# Patient Record
Sex: MALE | Race: OTHER | Employment: FULL TIME | URBAN - METROPOLITAN AREA
[De-identification: names, ages, dates, MRNs, and addresses within clinical notes are randomized per-mention and may not be internally consistent; named-entity substitution may affect disease eponyms.]

---

## 2019-08-08 ENCOUNTER — HOSPITAL ENCOUNTER (EMERGENCY)
Age: 29
Discharge: HOME | End: 2019-08-08
Attending: EMERGENCY MEDICINE
Payer: COMMERCIAL

## 2019-08-08 ENCOUNTER — APPOINTMENT (OUTPATIENT)
Dept: GENERAL RADIOLOGY | Age: 29
End: 2019-08-08
Attending: PHYSICIAN ASSISTANT
Payer: COMMERCIAL

## 2019-08-08 VITALS
HEIGHT: 66 IN | TEMPERATURE: 99.4 F | HEART RATE: 89 BPM | BODY MASS INDEX: 30.53 KG/M2 | RESPIRATION RATE: 14 BRPM | DIASTOLIC BLOOD PRESSURE: 84 MMHG | SYSTOLIC BLOOD PRESSURE: 146 MMHG | WEIGHT: 190 LBS

## 2019-08-08 DIAGNOSIS — M62.830 BACK MUSCLE SPASM: Primary | ICD-10-CM

## 2019-08-08 PROCEDURE — 96372 THER/PROPH/DIAG INJ SC/IM: CPT

## 2019-08-08 PROCEDURE — 72072 X-RAY EXAM THORAC SPINE 3VWS: CPT

## 2019-08-08 PROCEDURE — 74011250637 HC RX REV CODE- 250/637: Performed by: PHYSICIAN ASSISTANT

## 2019-08-08 PROCEDURE — 72100 X-RAY EXAM L-S SPINE 2/3 VWS: CPT

## 2019-08-08 PROCEDURE — 99283 EMERGENCY DEPT VISIT LOW MDM: CPT

## 2019-08-08 PROCEDURE — 74011250636 HC RX REV CODE- 250/636: Performed by: PHYSICIAN ASSISTANT

## 2019-08-08 RX ORDER — FENTANYL CITRATE 50 UG/ML
50 INJECTION, SOLUTION INTRAMUSCULAR; INTRAVENOUS
Status: DISCONTINUED | OUTPATIENT
Start: 2019-08-08 | End: 2019-08-09 | Stop reason: HOSPADM

## 2019-08-08 RX ORDER — NAPROXEN 500 MG/1
500 TABLET ORAL 2 TIMES DAILY WITH MEALS
Qty: 20 TAB | Refills: 0 | Status: SHIPPED | OUTPATIENT
Start: 2019-08-08 | End: 2019-08-18

## 2019-08-08 RX ORDER — METHOCARBAMOL 750 MG/1
750 TABLET, FILM COATED ORAL
Qty: 15 TAB | Refills: 0 | Status: SHIPPED | OUTPATIENT
Start: 2019-08-08

## 2019-08-08 RX ORDER — METHOCARBAMOL 750 MG/1
750 TABLET, FILM COATED ORAL
Status: COMPLETED | OUTPATIENT
Start: 2019-08-08 | End: 2019-08-08

## 2019-08-08 RX ORDER — KETOROLAC TROMETHAMINE 30 MG/ML
60 INJECTION, SOLUTION INTRAMUSCULAR; INTRAVENOUS
Status: COMPLETED | OUTPATIENT
Start: 2019-08-08 | End: 2019-08-08

## 2019-08-08 RX ORDER — DEXAMETHASONE SODIUM PHOSPHATE 10 MG/ML
10 INJECTION INTRAMUSCULAR; INTRAVENOUS
Status: DISCONTINUED | OUTPATIENT
Start: 2019-08-08 | End: 2019-08-09 | Stop reason: HOSPADM

## 2019-08-08 RX ADMIN — METHOCARBAMOL TABLETS 750 MG: 750 TABLET, COATED ORAL at 20:40

## 2019-08-08 RX ADMIN — KETOROLAC TROMETHAMINE 60 MG: 30 INJECTION, SOLUTION INTRAMUSCULAR at 20:40

## 2019-08-08 NOTE — ED PROVIDER NOTES
Suzan Wynne is a 33 y.o. male with no pertinent PMH presents to emergency room ambulatory for evaluation of lower thoracic, lumbar back pain. 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 He states while he was working today carrying a heavy object of her shoulders he felt a sudden twinge of pain in his lower thoracic and upper and middle lumbar spine. He has intermittent twinges of pain only in his back. He tried naproxen with little improvement. He denies numbness, tingling, weakness, saddle anesthesia, bowel/bladder dysfunction, urinary symptoms, flank pain, fever, chills, N/V/D. Ambulating without problem. Pain reproducible with movement and position change. Patient reports \"lifting something the wrong way\" at work today and c/o immediate onset lower back pain. He reports difficulty \"sitting up straight\" secondary to that. No urinary or bowel incontinence. Patient declines offer for phone , his partner translates for him at his request.    PCP: None    Surgical hx- see chart  Social hx- no ETOH    The patient has no other complaints at this time. No past medical history on file. No past surgical history on file. No family history on file.     Social History     Socioeconomic History    Marital status: Not on file     Spouse name: Not on file    Number of children: Not on file    Years of education: Not on file    Highest education level: Not on file   Occupational History    Not on file   Social Needs    Financial resource strain: Not on file    Food insecurity:     Worry: Not on file     Inability: Not on file    Transportation needs:     Medical: Not on file     Non-medical: Not on file   Tobacco Use    Smoking status: Not on file   Substance and Sexual Activity    Alcohol use: Not on file    Drug use: Not on file    Sexual activity: Not on file   Lifestyle    Physical activity:     Days per week: Not on file     Minutes per session: Not on file    Stress: Not on file   Relationships    Social connections:     Talks on phone: Not on file     Gets together: Not on file     Attends Druze service: Not on file     Active member of club or organization: Not on file     Attends meetings of clubs or organizations: Not on file     Relationship status: Not on file    Intimate partner violence:     Fear of current or ex partner: Not on file     Emotionally abused: Not on file     Physically abused: Not on file     Forced sexual activity: Not on file   Other Topics Concern    Not on file   Social History Narrative    Not on file         ALLERGIES: Patient has no known allergies. Review of Systems   Constitutional: Negative. Negative for activity change, chills, fatigue and unexpected weight change. HENT: Negative for trouble swallowing. Respiratory: Negative for cough, chest tightness, shortness of breath and wheezing. Cardiovascular: Negative. Negative for chest pain and palpitations. Gastrointestinal: Negative. Negative for abdominal pain, diarrhea, nausea and vomiting. Genitourinary: Negative. Negative for dysuria, flank pain, frequency and hematuria. Musculoskeletal: Positive for back pain. Negative for arthralgias, gait problem, neck pain and neck stiffness. Skin: Negative. Negative for color change and rash. Neurological: Negative. Negative for dizziness, numbness and headaches. All other systems reviewed and are negative. Vitals:    08/08/19 1952   BP: 146/84   Pulse: 89   Resp: 14   Temp: 99.4 °F (37.4 °C)   Weight: 86.2 kg (190 lb)   Height: 5' 6\" (1.676 m)            Physical Exam   Constitutional: He is oriented to person, place, and time. He appears well-developed and well-nourished. He is active. Non-toxic appearance. No distress.  male, intermittently uncomfortable appearing   HENT:   Head: Normocephalic and atraumatic. Eyes: Pupils are equal, round, and reactive to light.  Conjunctivae are normal. Right eye exhibits no discharge. Left eye exhibits no discharge. Neck: Normal range of motion and full passive range of motion without pain. No tracheal tenderness present. Cardiovascular: Normal rate, regular rhythm, normal heart sounds, intact distal pulses and normal pulses. Exam reveals no gallop and no friction rub. No murmur heard. Pulmonary/Chest: Effort normal and breath sounds normal. No respiratory distress. He has no wheezes. He has no rales. He exhibits no tenderness. Abdominal: Soft. Bowel sounds are normal. He exhibits no distension. There is no tenderness. There is no rebound and no guarding. Musculoskeletal: Normal range of motion. He exhibits tenderness. He exhibits no edema. Back:    Neurological: He is alert and oriented to person, place, and time. He has normal strength. No cranial nerve deficit or sensory deficit. Coordination normal.   Skin: Skin is warm, dry and intact. Capillary refill takes less than 2 seconds. No abrasion and no rash noted. He is not diaphoretic. No erythema. Psychiatric: He has a normal mood and affect. His speech is normal and behavior is normal. Cognition and memory are normal.   Nursing note and vitals reviewed. MDM  Number of Diagnoses or Management Options  Diagnosis management comments:   Ddx: muscle spasm, frx, HNP       Amount and/or Complexity of Data Reviewed  Review and summarize past medical records: yes  Discuss the patient with other providers: yes    Patient Progress  Patient progress: stable         Procedures    Patient still c/o low back pain on re-assessment, Discussed PO versus IV meds, he states he does not want to wait or get any injectable meds. He is asking for discharge and does not want further testing. He agrees to f/u with pcp or ortho if sx's persist. Return precautions discussed.     MEDICATIONS GIVEN:  Medications   ketorolac tromethamine (TORADOL) 60 mg/2 mL injection 60 mg (60 mg IntraMUSCular Given 8/8/19 2040)   methocarbamol (ROBAXIN) tablet 750 mg (750 mg Oral Given 8/8/19 2040)         DISCHARGE NOTE:  10:11 PM  The patient's results have been reviewed with them and/or available family. Patient and/or family verbally conveyed their understanding and agreement of the patient's signs, symptoms, diagnosis, treatment and prognosis and additionally agree to follow up as recommended in the discharge instructions or to return to the Emergency Room should their condition change prior to their follow-up appointment. The patient/family verbally agrees with the care-plan and verbally conveys that all of their questions have been answered. The discharge instructions have also been provided to the patient and/or family with some educational information regarding the patient's diagnosis as well a list of reasons why the patient would want to return to the ER prior to their follow-up appointment, should their condition change. Plan:  1. F/U with pcp, ortho   2. Rx naproxen, robaxin  3.  Warm compresses, gentle stretches discussed  Return precautions discussed and advised to return to ER if worse

## 2019-08-08 NOTE — ED TRIAGE NOTES
Pt was lifting something at work today when began experiencing low back with tingling down both legs. Denies loss of bowel/bladder. Pt feels like he can not walk. Ambulates to triage with steady gait.

## 2019-08-09 NOTE — DISCHARGE INSTRUCTIONS
Dolor de espalda: Instrucciones de cuidado - [ Back Pain: Care Instructions ]  Instrucciones de cuidado    El dolor de espalda tiene muchas causas posibles. Con frecuencia, está relacionado con problemas en los músculos y ligamentos de la espalda. También podría estar relacionado con problemas de los nervios, discos o huesos de la espalda. Moverse, levantar algo, ponerse de pie, sentarse o dormir de Xochitl incómoda pueden forzar la espalda. Algunas veces no se da cuenta de que tiene kelechi lesión [employer] tarde. La artritis es otra causa común del dolor de espalda. Aunque new vez duela mucho, el dolor de espalda por lo general mejora por sí mismo en varias semanas. [address] personas se recuperan en 12 semanas o menos. Hacer un buen tratamiento en el hogar y tener cuidado de no forzar la espalda puede ayudar a que se sienta mejor más pronto. La atención de seguimiento es kelechi parte clave de hillman tratamiento y seguridad. Asegúrese de hacer y acudir a todas las citas, y llame a hillman médico si está teniendo problemas. También es kelechi buena idea saber los resultados de kendell exámenes y mantener kelechi lista de los medicamentos que adrian. ¿Cómo puede cuidarse en el hogar? · Siéntese o acuéstese en las posiciones más cómodas y que reduzcan el dolor. Trate kelechi de estas posiciones al [place]ma Lu[place]:  ? Acuéstese boca arriba con las rodillas dobladas y apoyadas sobre [address]. ? Acuéstese en el piso con las piernas sobre el asiento de un sofá o kelechi silla. ? Acuéstese de lado con las rodillas y caderas dobladas y [place] almohada entre las piernas. ? Acuéstese boca abajo siempre que esta posición no empeore el dolor. · No se siente en la cama y evite los sofás blandos y las posiciones torcidas. El reposo en cama puede aliviar el dolor al principio, patricia retrasa la sanación. Evite reposar en la cama después del primer día de dolor de espalda. · Cambie de posición cada 30 minutos.  Si debe sentarse por [employer], párese y descanse de estar sentado. Levántese y camine, o acuéstese en kelechi posición cómoda. · Pruebe usar kelechi almohadilla térmica a temperatura baja o mediana alma 15 a 20 minutos cada 2 ó 3 horas. Pruebe kelechi ducha tibia en vez de kelechi sesión con la almohadilla térmica. · También puede probar kelechi compresa de hielo alma 10 a 15 minutos cada 2 a 3 horas. Póngase un paño carney entre la compresa de hielo y la piel. · [employer] analgésicos (medicamentos para el dolor) exactamente según las indicaciones. ? Si el médico le recetó un analgésico, tómelo según las indicaciones. ? Si no está tomando un analgésico recetado, pregúntele a hillman médico si puede colt katina de [place]. · Vincent caminatas cortas varias veces al día. Usted puede comenzar con 5 a 10 minutos, 3 ó 4 veces al día, y aumentar progresivamente hasta lograr caminatas más largas. Camine en superficies jessica y evite colinas y escaleras hasta que la espalda esté mejor. · Regrese al Norris Kida y otras actividades lo más pronto posible. El descanso continuo sin actividad por lo general no es downey para la espalda. · Para prevenir el dolor de espalda en el futuro, vincent ejercicios para estirar y fortalecer la espalda y el abdomen. Aprenda a [place], técnicas seguras para levantar peso y la mecánica corporal apropiada. ¿Cuándo debe pedir ayuda? Llame a hillman médico ahora mismo o busque atención médica inmediata si:    · Tiene un entumecimiento nuevo o peor en las piernas.     · Tiene debilidad nueva o peor en las piernas. (Orr puede hacer que le resulte difícil ponerse de pie).   · Pierde el control de la vejiga o los intestinos.    Preste especial atención a los cambios en hillman rafat y asegúrese de comunicarse con hillman médico si:    · Tiene fiebre, pierde peso o no se siente jessica.     · No mejora brandie se esperaba. ¿Dónde puede encontrar más información en inglés? Rashid Wylie a [email or web address]   Escriba Z330 en la búsqueda para aprender más acerca de \"Dolor de espalda: Instrucciones de cuidado - [ Back Pain: Care Instructions ]. \"  Revisado: 20 septiembre, 2018  Versión del contenido: 12.1  © 4484-0959 Healthwise, Incorporated. Las instrucciones de cuidado fueron adaptadas bajo licencia por [employer] (which disclaims liability or warranty for this information). Si usted tiene [place] afección médica o sobre estas instrucciones, siempre pregunte a hillman profesional de rafat. Healthwise, Incorporated niega toda garantía o responsabilidad por hillman uso de esta información. Patient Education        Espasmo en la espalda: Instrucciones de cuidado - [ Back Spasm: Care Instructions ]  Instrucciones de cuidado  Un espasmo en la espalda consiste en rigidez y dolor súbitos en los músculos de la espalda. Puede suceder por uso excesivo o kelechi lesión. Cosas brandie dormir en kelechi posición incómoda, inclinarse, levantar algo, pararse o sentarse a veces pueden causar un espasmo. Negar no siempre está lorene cuál es la causa. El tratamiento en el hogar incluye usar calor o hielo, colt analgésicos (medicamentos para el dolor) de venta yareli (OTC, por kendell siglas en inglés) y evitar actividades que puedan darle dolor de espalda. Para un espasmo en la espalda que no mejora con tratamiento en el hogar, hillman médico podría recetarle un medicamento. Los tratamientos [employer] o manipulación también pueden ayudar a aliviar un espasmo en la espalda. Hillman médico también puede sugerir ejercicio o fisioterapia para ayudarle a mejorar la fuerza y la flexibilidad en los músculos de la espalda. En la [employer], volver a las actividades normales es downey para la espalda. Simplemente asegúrese de evitar hacer cosas que le empeoren el dolor. La atención de seguimiento es kelechi parte clave de hillman tratamiento y seguridad. Asegúrese de hacer y acudir a todas las citas, y llame a hillman médico si está teniendo problemas.  También es kelechi buena idea saber los resultados de kendell exámenes y mantener kelechi lista de los medicamentos que adrian. ¿Cómo puede cuidarse en el hogar?   Calor, hielo y [employer]    · Para aliviar el dolor, use calor o hielo (el que le dé mayor alivio) en la marjan afectada. ? Póngase kelechi bolsa de [place], kelechi almohadilla eléctrica ajustada a nivel bajo o un paño tibio en la espalda. Póngase un paño carney entre la almohadilla térmica y la piel. No se vaya a dormir con kelechi almohadilla térmica sobre la piel. ? Pruebe hielo o kelechi compresa fría en la marjan por entre 10 y 21 minutos cada vez. Póngase un paño carney entre el hielo y la piel.     · Para la mayoría de los rohit de [place], usted puede colt analgésicos (medicamentos para el dolor) de venta yareli. Los medicamentos antiinflamatorios no esteroideos (KAREEM), brandie ibuprofeno o naproxeno, parecen funcionar mejor. Negar si no puede colt KAREEM, puede probar acetaminofén. Hillman médico puede recetarle medicamentos más potentes si es necesario. Sea adele con los medicamentos. Angie y siga todas las instrucciones de la etiqueta.    Posiciones y postura del cuerpo    · Siéntese o recuéstese en las posiciones más cómodas para usted y que reduzcan el dolor. Pruebe kelechi de estas posiciones cuando se recueste:  ? Recuéstese de espaldas con las rodillas flexionadas y [employer]. ? Recuéstese en el piso con las piernas sobre el asiento de un sofá o kelechi silla. ? Recuéstese de lado con las rodillas y caderas flexionadas y [facility] [phone] [facility]. ? Recuéstese boca abajo si esta posición no empeora hillman dolor.     · No se siente erguido en cama. Evite sillones blandos y posiciones torcidas.     · Evite hacer reposo en cama después del primer día de dolor de espalda. El reposo en cama puede aliviar el dolor al principio, negar retrasa la sanación.  El reposo continuo sin actividad generalmente no es downey para la espalda.     · Si debe sentarse por largos períodos de tiempo, tome descansos para levantarse o acostarse. Cambie de posición cada 30 minutos. Levántese y camine, o acuéstese en kelechi posición cómoda. Actividad    · Karissa caminatas cortas varias veces al día. Usted puede comenzar con caminatas de 5 a 10 minutos, 3 o 4 veces al día, y aumentar progresivamente hasta lograr caminatas más largas. Camine sobre superficies jessica y evite kelsea y escaleras hasta que sienta menos dolor en la espalda.     · Después de comenzar a sentir kelechi mejoría en la espalda, trate de estirar los músculos todos los días, especialmente antes y después de hacer [id number] y a la hora de [id number]. Estirarse con regularidad puede ayudar a relajar los músculos.     · Para prevenir el dolor de espalda en el futuro, karissa ejercicios para estirar y fortalecer la espalda y el abdomen. Aprenda a [place], usar técnicas seguras para levantar objetos y [place] de moverse para ayudar a evitar rohit de espalda. ¿Cuándo debe pedir ayuda? Llame al 911 en cualquier momento que considere que necesita atención de [place]. Por ejemplo, llame si:    · Es absolutamente incapaz de  el brazo o la pierna.    Llame a hillman médico ahora mismo o busque atención médica inmediata si:    · Tiene síntomas nuevos o peores en las piernas, el abdomen o las nalgas. Los síntomas pueden incluir:  ? Entumecimiento u hormigueo. ? Debilidad. ? Dolor.     · Pierde el control de la vejiga o los intestinos.    Preste especial atención a los cambios en hillman rafat y asegúrese de comunicarse con hillman médico si:    · Tiene fiebre, pierde peso o no se siente jessica.     · No mejora brandie se esperaba. ¿Dónde puede encontrar más información en inglés? Sherine Hutchins a [email or web address].[email or web address]/. Escriba E232 en la búsqueda para aprender más acerca de \"Espasmo en la espalda: Instrucciones de cuidado - [ Back Spasm: Care Instructions ]. \"  Revisado: 20 septiembre, 2018  Versión del contenido: 12.1  © 0785-4562 Healthwise, [employer]. Las instrucciones de cuidado fueron adaptadas bajo licencia por [facility] (which disclaims liability or warranty for this information). Si usted tiene [place] afección médica o sobre estas instrucciones, siempre pregunte a hillman profesional de rafat. [employer], [employer]ND[employer] niega toda garantía o responsabilidad por hillman uso de esta información.